# Patient Record
Sex: FEMALE | Race: WHITE | NOT HISPANIC OR LATINO | Employment: FULL TIME
[De-identification: names, ages, dates, MRNs, and addresses within clinical notes are randomized per-mention and may not be internally consistent; named-entity substitution may affect disease eponyms.]

---

## 2017-07-17 ENCOUNTER — RECORDS - HEALTHEAST (OUTPATIENT)
Dept: ADMINISTRATIVE | Facility: OTHER | Age: 22
End: 2017-07-17

## 2017-08-04 ENCOUNTER — RECORDS - HEALTHEAST (OUTPATIENT)
Dept: ADMINISTRATIVE | Facility: OTHER | Age: 22
End: 2017-08-04

## 2017-11-10 ENCOUNTER — COMMUNICATION - HEALTHEAST (OUTPATIENT)
Dept: ADMINISTRATIVE | Facility: CLINIC | Age: 22
End: 2017-11-10

## 2017-11-13 ENCOUNTER — OFFICE VISIT - HEALTHEAST (OUTPATIENT)
Dept: FAMILY MEDICINE | Facility: CLINIC | Age: 22
End: 2017-11-13

## 2017-11-13 DIAGNOSIS — K92.1 BLOODY STOOL: ICD-10-CM

## 2017-11-13 DIAGNOSIS — R19.8 ALTERNATING CONSTIPATION AND DIARRHEA: ICD-10-CM

## 2017-11-13 LAB
CREAT SERPL-MCNC: 0.75 MG/DL (ref 0.6–1.1)
GFR SERPL CREATININE-BSD FRML MDRD: >60 ML/MIN/1.73M2

## 2017-11-16 ENCOUNTER — COMMUNICATION - HEALTHEAST (OUTPATIENT)
Dept: FAMILY MEDICINE | Facility: CLINIC | Age: 22
End: 2017-11-16

## 2017-11-16 LAB
TTG IGA SER-ACNC: 0.3 U/ML
TTG IGG SER-ACNC: 1.4 U/ML

## 2020-10-20 ENCOUNTER — TELEPHONE (OUTPATIENT)
Dept: BEHAVIORAL HEALTH | Facility: CLINIC | Age: 25
End: 2020-10-20

## 2020-10-20 ENCOUNTER — HOSPITAL ENCOUNTER (EMERGENCY)
Facility: CLINIC | Age: 25
Discharge: HOME | End: 2020-10-20
Attending: PSYCHIATRY & NEUROLOGY | Admitting: PSYCHIATRY & NEUROLOGY
Payer: OTHER GOVERNMENT

## 2020-10-20 VITALS
HEART RATE: 85 BPM | OXYGEN SATURATION: 98 % | RESPIRATION RATE: 16 BRPM | SYSTOLIC BLOOD PRESSURE: 111 MMHG | TEMPERATURE: 98.5 F | DIASTOLIC BLOOD PRESSURE: 68 MMHG

## 2020-10-20 DIAGNOSIS — F43.0 EMOTIONAL CRISIS AS ACUTE REACTION TO EXCEPTIONAL (GROSS) STRESS: ICD-10-CM

## 2020-10-20 DIAGNOSIS — F32.A DEPRESSION WITH SUICIDAL IDEATION: ICD-10-CM

## 2020-10-20 DIAGNOSIS — F41.1 GAD (GENERALIZED ANXIETY DISORDER): ICD-10-CM

## 2020-10-20 DIAGNOSIS — R45.851 DEPRESSION WITH SUICIDAL IDEATION: ICD-10-CM

## 2020-10-20 DIAGNOSIS — F50.00 ANOREXIA NERVOSA (H): ICD-10-CM

## 2020-10-20 LAB
AMPHETAMINES UR QL SCN: NEGATIVE
BARBITURATES UR QL: NEGATIVE
BENZODIAZ UR QL: NEGATIVE
CANNABINOIDS UR QL SCN: NEGATIVE
COCAINE UR QL: NEGATIVE
ETHANOL UR QL SCN: NEGATIVE
HCG UR QL: NEGATIVE
OPIATES UR QL SCN: NEGATIVE
SARS-COV-2 RNA SPEC QL NAA+PROBE: NORMAL
SPECIMEN SOURCE: NORMAL

## 2020-10-20 PROCEDURE — 99284 EMERGENCY DEPT VISIT MOD MDM: CPT | Performed by: PSYCHIATRY & NEUROLOGY

## 2020-10-20 PROCEDURE — 81025 URINE PREGNANCY TEST: CPT | Performed by: PSYCHIATRY & NEUROLOGY

## 2020-10-20 PROCEDURE — 250N000013 HC RX MED GY IP 250 OP 250 PS 637: Performed by: PSYCHIATRY & NEUROLOGY

## 2020-10-20 PROCEDURE — U0003 INFECTIOUS AGENT DETECTION BY NUCLEIC ACID (DNA OR RNA); SEVERE ACUTE RESPIRATORY SYNDROME CORONAVIRUS 2 (SARS-COV-2) (CORONAVIRUS DISEASE [COVID-19]), AMPLIFIED PROBE TECHNIQUE, MAKING USE OF HIGH THROUGHPUT TECHNOLOGIES AS DESCRIBED BY CMS-2020-01-R: HCPCS | Performed by: PSYCHIATRY & NEUROLOGY

## 2020-10-20 PROCEDURE — C9803 HOPD COVID-19 SPEC COLLECT: HCPCS | Performed by: PSYCHIATRY & NEUROLOGY

## 2020-10-20 PROCEDURE — 80307 DRUG TEST PRSMV CHEM ANLYZR: CPT | Performed by: PSYCHIATRY & NEUROLOGY

## 2020-10-20 PROCEDURE — 90791 PSYCH DIAGNOSTIC EVALUATION: CPT

## 2020-10-20 PROCEDURE — 99285 EMERGENCY DEPT VISIT HI MDM: CPT | Mod: 25 | Performed by: PSYCHIATRY & NEUROLOGY

## 2020-10-20 PROCEDURE — 80320 DRUG SCREEN QUANTALCOHOLS: CPT | Performed by: PSYCHIATRY & NEUROLOGY

## 2020-10-20 RX ORDER — SERTRALINE HYDROCHLORIDE 100 MG/1
200 TABLET, FILM COATED ORAL EVERY EVENING
COMMUNITY

## 2020-10-20 RX ORDER — CYCLOBENZAPRINE HCL 10 MG
10 TABLET ORAL 2 TIMES DAILY PRN
COMMUNITY

## 2020-10-20 RX ORDER — POLYETHYLENE GLYCOL 3350 17 G/17G
1 POWDER, FOR SOLUTION ORAL DAILY
COMMUNITY

## 2020-10-20 RX ORDER — VITAMIN A ACETATE, .BETA.-CAROTENE, ASCORBIC ACID, CHOLECALCIFEROL, .ALPHA.-TOCOPHEROL ACETATE, DL-, THIAMINE MONONITRATE, RIBOFLAVIN, NIACINAMIDE, PYRIDOXINE HYDROCHLORIDE, FOLIC ACID, CYANOCOBALAMIN, CALCIUM CARBONATE, FERROUS FUMARATE, ZINC OXIDE, AND CUPRIC OXIDE 2000; 2000; 120; 400; 22; 1.84; 3; 20; 10; 1; 12; 200; 27; 25; 2 [IU]/1; [IU]/1; MG/1; [IU]/1; MG/1; MG/1; MG/1; MG/1; MG/1; MG/1; UG/1; MG/1; MG/1; MG/1; MG/1
1 TABLET ORAL DAILY
COMMUNITY

## 2020-10-20 RX ORDER — HYDROXYZINE HYDROCHLORIDE 25 MG/1
25 TABLET, FILM COATED ORAL ONCE
Status: COMPLETED | OUTPATIENT
Start: 2020-10-20 | End: 2020-10-20

## 2020-10-20 RX ORDER — HYDROXYZINE HYDROCHLORIDE 10 MG/1
10 TABLET, FILM COATED ORAL 2 TIMES DAILY PRN
COMMUNITY

## 2020-10-20 RX ORDER — OLANZAPINE 5 MG/1
5 TABLET, ORALLY DISINTEGRATING ORAL ONCE
Status: DISCONTINUED | OUTPATIENT
Start: 2020-10-20 | End: 2020-10-20 | Stop reason: HOSPADM

## 2020-10-20 RX ORDER — BUPROPION HYDROCHLORIDE 450 MG/1
450 TABLET, FILM COATED, EXTENDED RELEASE ORAL DAILY
COMMUNITY

## 2020-10-20 RX ORDER — TRAZODONE HYDROCHLORIDE 50 MG/1
100 TABLET, FILM COATED ORAL AT BEDTIME
COMMUNITY

## 2020-10-20 RX ADMIN — HYDROXYZINE HYDROCHLORIDE 12.5 MG: 25 TABLET, FILM COATED ORAL at 14:58

## 2020-10-20 SDOH — HEALTH STABILITY: MENTAL HEALTH: HOW OFTEN DO YOU HAVE A DRINK CONTAINING ALCOHOL?: NEVER

## 2020-10-20 ASSESSMENT — ENCOUNTER SYMPTOMS
NEUROLOGICAL NEGATIVE: 1
EYES NEGATIVE: 1
DECREASED CONCENTRATION: 1
CONSTITUTIONAL NEGATIVE: 1
RESPIRATORY NEGATIVE: 1
MUSCULOSKELETAL NEGATIVE: 1
NERVOUS/ANXIOUS: 1
GASTROINTESTINAL NEGATIVE: 1
CARDIOVASCULAR NEGATIVE: 1
HALLUCINATIONS: 0
HYPERACTIVE: 0

## 2020-10-20 NOTE — ED AVS SNAPSHOT
[facility] Emergency Department  [address]  [place] [address]  Phone: [phone]  Fax: [phone]                                    Judy Polanco   MRN: [id number]    Department: [facility] Emergency Department   Date of Visit: 10/20/2020           After Visit Summary Signature Page    I have received my discharge instructions, and my questions have been answered. I have discussed any challenges I see with this plan with the nurse or doctor.    ..........................................................................................................................................  Patient/Patient Representative Signature      ..........................................................................................................................................  Patient Representative Print Name and Relationship to Patient    ..................................................               ................................................  Date                                   Time    ..........................................................................................................................................  Reviewed by Signature/Title    ...................................................              ..............................................  Date                                               Time          22EPIC Rev 08/18

## 2020-10-20 NOTE — ED NOTES
Pt talked to Dr Ba about medication and MD thought she would take it.  When offered it to her she began to cry and get agitated.   MD updated and med not given.  Talking to  on I Pad.

## 2020-10-20 NOTE — ED TRIAGE NOTES
States she has been in residential treatment for the past 6 months. Presently at the [place] program. States she wishes she was dead,but doesn't want to kill self but doesn't want to live

## 2020-10-20 NOTE — ED NOTES
Patient reporting that her SI is not as strong right now and wondering whether she needs admission anymore; MD notified.

## 2020-10-20 NOTE — ED NOTES
Patient has scabbed linear meds noted to left inner wrist area. Pt states he doesn't remember how she hurt self

## 2020-10-20 NOTE — ED NOTES
Bed: HW02  Expected date: 10/20/20  Expected time: 10:52 AM  Means of arrival:   Comments:  Hen [address] 24 y/o female SI cooperative, no COVID

## 2020-10-20 NOTE — TELEPHONE ENCOUNTER
S:  Pt is a 25 yr old female in the [place] ED / [facility] seen due to SI.    B:  Info per DIONISIO Hurtado  :  Pt has been in the residential eating disorders program at the [facility].  Today she was going to move to a lesser level of care there and she reported she didn't feel safe.  Pt states she is having very intrusive S.I. with thoughts of overdosing or walking into traffic..  She states she's been thinking about it for a couple of weeks..  She has no hx of attempts, no previous hospitalizations.  She did cut herself yesterday.  Pt has a hx of Anorexia, OCD, MARCIA and MDD.  She is from [place] and her family is there.pt is quite dysregulated in the ED.  Denies chem use and utox is negative.         She is asymptomatic for Covid and a test is in process.  Medically cleared.  Please see chart for further information.    Patient cleared and ready for behavioral bed placement: Yes    A:  Needing hospitalization for safety and stabilization.    R:  Awaiting appropriate placement.

## 2020-10-20 NOTE — ED NOTES
Pt declining to take Zidis and states she is afraid it will make her gain weight.  Pt began to cry and become agitated.  Dr Ba in to see pt.

## 2020-10-20 NOTE — PHARMACY-ADMISSION MEDICATION HISTORY
Admission medication history interview status for the 10/20/2020 admission is complete. See Epic admission navigator for allergy information, pharmacy, prior to admission medications and immunization status.     Medication history interview sources:  Patient interview    Changes made to PTA medication list (reason)  Added: Miralax, trazodone  Deleted: None  Changed: None    Additional medication history information (including reliability of information, actions taken by pharmacist):  -Patient is an excellent historian. She has been in the [facility] where the staff administers her medications, but she knows the names and doses of her medications without prompting.    Prior to Admission medications    Medication Sig Last Dose Taking? Auth Provider   buPROPion HCl ER, XL, 450 MG TB24 Take 450 mg by mouth daily 10/20/2020 Yes Reported, Patient   cyclobenzaprine (FLEXERIL) 10 MG tablet Take 10 mg by mouth 2 times daily as needed for muscle spasms Past Month Yes Reported, Patient   hydrOXYzine (ATARAX) 10 MG tablet Take 10 mg by mouth 2 times daily as needed for anxiety  10/19/2020 Yes Reported, Patient   polyethylene glycol (MIRALAX) 17 g packet Take 1 packet by mouth daily 10/20/2020 Yes Unknown, Entered By History   Prenatal Vit-Fe Fumarate-FA (PNV PRENATAL PLUS MULTIVITAMIN) 27-1 MG TABS per tablet Take 1 tablet by mouth daily 10/20/2020 Yes Reported, Patient   sertraline (ZOLOFT) 100 MG tablet Take 200 mg by mouth every evening  10/19/2020 Yes Reported, Patient   traZODone (DESYREL) 50 MG tablet Take 100 mg by mouth At Bedtime 10/19/2020 Yes Unknown, Entered By History     Medication history completed by:   Lolly Garnica, AshleyD, [facility]

## 2020-10-20 NOTE — ED NOTES
I have performed an in person assessment of the patient. Based on this assessment the patient no longer requires a one on one attendant at this point in time.    Maxim Osei MD  11:36 AM  October 20, 2020         Maxim Osei MD  10/20/20 1137

## 2020-10-20 NOTE — ED PROVIDER NOTES
ED Provider Note  [facility]      History     Chief Complaint   Patient presents with     Suicidal     feeling suicidal. plan to step in front of traffic     HPI  Judy Polanco is a 25 year old female who is here referred in by her [facility] treatment team due to feeling vulnerable and suicidal. Patient has anorexia nervosa and just completed a 6 week residential treatment at [facility]. She was to segue to an outpatient program and be placed in a group housing setting. She previously had an aide who was watching her closely while in treatment and patient suddenly felt bereft of a comforting presence. She did not feel she can function being out of treatment. She got highly anxious and was entertaining suicide by overdose or running into traffic.     Patient continued to be emotionally fragile here while she was waiting to be interviewed. She reports not being safe in the community. She has no history of psychiatric hospitalization. She reports her eating disorder issues have been stabilized. She denies using drugs. She denies COVID symptoms nor been exposed to COVID cases while in residential treatment.    Please see DEC Crisis Assessment on 10/20/2020 in Epic for further details.    PERSONAL MEDICAL HISTORY  Past Medical History:   Diagnosis Date     Anorexia nervosa      Anxiety      Depressive disorder      PAST SURGICAL HISTORY  Past Surgical History:   Procedure Laterality Date     COLONOSCOPY       ENT SURGERY  2015    Kyle gallegos.     FAMILY HISTORY  History reviewed. No pertinent family history.  SOCIAL HISTORY  Social History     Tobacco Use     Smoking status: Never Smoker     Smokeless tobacco: Never Used   Substance Use Topics     Alcohol use: Never     Frequency: Never     MEDICATIONS  Current Facility-Administered Medications   Medication     OLANZapine zydis (zyPREXA) ODT tab 5 mg     Current Outpatient Medications   Medication     buPROPion HCl ER, XL, 450 MG  TB24     cyclobenzaprine (FLEXERIL) 10 MG tablet     hydrOXYzine (ATARAX) 10 MG tablet     Prenatal Vit-Fe Fumarate-FA (PNV PRENATAL PLUS MULTIVITAMIN) 27-1 MG TABS per tablet     sertraline (ZOLOFT) 100 MG tablet     ALLERGIES  Allergies   Allergen Reactions     Sulfa Drugs           Review of Systems   Constitutional: Negative.    HENT: Negative.    Eyes: Negative.    Respiratory: Negative.    Cardiovascular: Negative.    Gastrointestinal: Negative.    Genitourinary: Negative.    Musculoskeletal: Negative.    Skin: Negative.    Neurological: Negative.    Psychiatric/Behavioral: Positive for decreased concentration, self-injury and suicidal ideas. Negative for hallucinations. The patient is nervous/anxious. The patient is not hyperactive.    All other systems reviewed and are negative.        Physical Exam   BP: 114/70  Pulse: 72  Temp: 97.9  F (36.6  C)  Resp: 16  SpO2: 98 %  Physical Exam  Vitals signs and nursing note reviewed.   HENT:      Head: Normocephalic.   Eyes:      Pupils: Pupils are equal, round, and reactive to light.   Neck:      Musculoskeletal: Normal range of motion.   Pulmonary:      Effort: Pulmonary effort is normal.   Musculoskeletal: Normal range of motion.   Neurological:      General: No focal deficit present.      Mental Status: She is alert.   Psychiatric:         Attention and Perception: Attention and perception normal. She does not perceive auditory or visual hallucinations.         Mood and Affect: Mood is anxious.         Speech: Speech normal.         Behavior: Behavior normal. Behavior is not agitated, aggressive, hyperactive or combative. Behavior is cooperative.         Thought Content: Thought content is not paranoid or delusional. Thought content includes suicidal ideation. Thought content does not include homicidal ideation. Thought content includes suicidal plan.         Cognition and Memory: Cognition and memory normal.         Judgment: Judgment normal.         ED Course       Procedures             No results found for any visits on 10/20/20.  Medications   OLANZapine zydis (zyPREXA) ODT tab 5 mg (5 mg Oral Not Given 10/20/20 1332)   hydrOXYzine (ATARAX) tablet 25 mg (12.5 mg Oral Given 10/20/20 1508)        Assessments & Plan (with Medical Decision Making)   Patient with history of MARCIA and anorexia nervosa who felt overwhelmed on discharge from her residential treatment program and was panicking about her ability to function. She started to have thoughts of death and suicide and developed plans. She does not feel safe being in the community. She is referred for admission. She is voluntary.    Patient while waiting for admission started to feel better and no longer felt she needed to be hospitalized. She talked to her lodging program who will let her enter their program tomorrow. Patient had arranged to stay with her supportive friend overnight tonight. She would like to be discharged. I do not feel patient is at imminent safety risk. She has been in emotional control for the duration of her stay. I do not find her holdable. Patient can be discharged. She is to follow-up with her ongoing treatment tomorrow.     I have reviewed the nursing notes. I have reviewed the findings, diagnosis, plan and need for follow up with the patient.    New Prescriptions    No medications on file       Final diagnoses:   Depression with suicidal ideation   Emotional crisis as acute reaction to exceptional (gross) stress   MARCIA (generalized anxiety disorder)   Anorexia nervosa       --  Donavan Torres MD  [facility] EMERGENCY DEPARTMENT  10/20/2020     Donavan Torres MD  10/20/20 1518       Donavan Torres MD  10/20/20 1937

## 2020-10-21 LAB
LABORATORY COMMENT REPORT: NORMAL
SARS-COV-2 RNA SPEC QL NAA+PROBE: NEGATIVE
SPECIMEN SOURCE: NORMAL

## 2020-10-21 NOTE — DISCHARGE INSTRUCTIONS
I am glad that your are feeling better.  It is good that you are staying with your friend tonight. Follow-up with your ongoing treatment plan tomorrow

## 2021-06-14 NOTE — PROGRESS NOTES
"Assessment / Impression     1. Alternating constipation and diarrhea  HM1(CBC and Differential)    Hepatic Profile    Electrolyte Profile    Creatinine    Amylase    Tissue transglutaminase, IgA    Ambulatory referral to Gastroenterology    HM1 (CBC with Diff)           Plan:     [facility] which they suspected she may have IBS.  This may still be IBS, however she is having significant number of bowel movements and diarrhea.  She also has had some mild weight loss, though this may have been intentional as well.  Because she has had these multiple symptoms, along with bloody stool that may warrant colonoscopy, would recommend referral to gastroenterology for further evaluation and to determine whether colonoscopy would be appropriate.  For now, discussed the patient I recommend ordering above labs.  Also recommend that she cut out all dairy from her diet and to avoid using laxatives.  Follow-up here as needed.  Patient understands, agrees with plan.    Subjective:      HPI: Judy Polanco is a 22 y.o. female, new to [facility] who presents for \"stomach concerns.\" Symptoms started 4-5 months ago when she was working out more and changed diet.  Alternates between phases of stomach cramps and multiple bowel movements.  There was 1 day she had up to 16 BMs, then she will go 3-4 days without having BM.  Symptoms started over the summer, felt she may be more sensitive to more foods, mikhail. Spicy foods or acidic foods.  She was on sertraline from end of July-September this year, and GI symptoms got worse during that time.  She was then put on Paxil, then she was constipated and didn't have appetite, so then was taken off it.  Was seen at [facility], and it was suggested she had IBS and was told to start metamucil, and had even more frequent bowel movements.  Never started Prilosec.  She has had 4 BMs today.  Has had blood in the stool when she has had multiple BMs. Has lost " about 14 lb in the last years, and 6 pounds with the last 4 months, though patient feels is intentional.  Has had abdominal pain.  No fevers, chills, sweats. She was told previously that this may be anxiety induced.  No family history of colon cancer, Crohns, or UC.  Has taken laxatives or stool softeners when she's constipated, though then she feels symptoms get worse. She took laxative yesterday because she hadn't had BM in 4 days, and today had 4 BMs.  No N/V.       Medical History:     There is no problem list on file for this patient.      No past medical history on file.    Past Surgical History:   Procedure Laterality Date     WISDOM TOOTH EXTRACTION         Current Medications:     No current outpatient prescriptions on file.       Family History:     Family History   Problem Relation Age of Onset     Anxiety disorder Maternal Grandmother      Anxiety disorder Maternal Grandfather      Heart attack Maternal Grandfather      Diabetes Maternal Grandfather      Heart attack Paternal Grandfather      Colon cancer Neg Hx      Inflammatory bowel disease Neg Hx        Review of Systems  All other systems reviewed and are negative.         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           Objective:     /68 (Patient Site: Right Arm, Patient Position: Sitting, Cuff Size: Adult Regular)  LMP 11/09/2017 (Approximate)  Breastfeeding? No  Physical Examination: General appearance - alert, well appearing, and in no distress  Eyes: pupils equal and reactive, extraocular eye movements intact  Ears: normal external ears, clear canals,  TMs appear normal, with no redness or bulging noted.  Mouth: mucous membranes moist, pharynx normal without lesions  Neck: supple, no significant adenopathy or thyromegaly  Lungs: clear to auscultation, no wheezes, rales or  rhonchi, symmetric air entry  Heart: normal rate, regular rhythm, normal S1, S2, no murmurs.  Abdomen: soft, nontender, nondistended, no masses or organomegaly  Rectal: no external lesions.  Normal tone.  Anoscopy showed few internal hemorrhoids.  No masses.  Neurological: alert, oriented, normal speech, no focal findings or movement disorder noted.    Extremities: No edema, no clubbing or cyanosis  Psychiatric: Normal affect. Does not appear anxious or depressed.    Recent Results (from the past 168 hour(s))   HM1 (CBC with Diff)   Result Value Ref Range    WBC 5.3 4.0 - 11.0 thou/uL    RBC 4.75 3.80 - 5.40 mill/uL    Hemoglobin 14.6 12.0 - 16.0 g/dL    Hematocrit 43.5 35.0 - 47.0 %    MCV 91 80 - 100 fL    MCH 30.7 27.0 - 34.0 pg    MCHC 33.5 32.0 - 36.0 g/dL    RDW 12.1 11.0 - 14.5 %    Platelets 270 140 - 440 thou/uL    MPV 7.8 7.0 - 10.0 fL    Neutrophils % 57 50 - 70 %    Lymphocytes % 33 20 - 40 %    Monocytes % 6 2 - 10 %    Eosinophils % 3 0 - 6 %    Basophils % 0 0 - 2 %    Neutrophils Absolute 3.0 2.0 - 7.7 thou/uL    Lymphocytes Absolute 1.8 0.8 - 4.4 thou/uL    Monocytes Absolute 0.3 0.0 - 0.9 thou/uL    Eosinophils Absolute 0.2 0.0 - 0.4 thou/uL    Basophils Absolute 0.0 0.0 - 0.2 thou/uL         Jeanne De Luna MD  11/13/2017  1:18 PM

## 2022-08-22 ENCOUNTER — HOSPITAL ENCOUNTER (EMERGENCY)
Facility: CLINIC | Age: 27
End: 2022-08-22

## 2022-11-18 ENCOUNTER — LAB REQUISITION (OUTPATIENT)
Dept: LAB | Facility: CLINIC | Age: 27
End: 2022-11-18
Payer: COMMERCIAL

## 2022-11-18 DIAGNOSIS — L08.9 LOCAL INFECTION OF THE SKIN AND SUBCUTANEOUS TISSUE, UNSPECIFIED: ICD-10-CM

## 2022-11-18 PROCEDURE — 87070 CULTURE OTHR SPECIMN AEROBIC: CPT | Mod: ORL | Performed by: DERMATOLOGY

## 2022-11-20 LAB — BACTERIA WND CULT: NORMAL
